# Patient Record
(demographics unavailable — no encounter records)

---

## 2025-03-25 NOTE — HISTORY OF PRESENT ILLNESS
[FreeTextEntry1] : marycruz [de-identified] : Here for follow up visit. Doing well. Appetitie, sleep,bms,voiding, mood all ok.  Interim and relevant labs, imaging and consultations reviewed.

## 2025-03-25 NOTE — PHYSICAL EXAM
[No Acute Distress] : no acute distress [Well Nourished] : well nourished [Well Developed] : well developed [Well-Appearing] : well-appearing [Normal Sclera/Conjunctiva] : normal sclera/conjunctiva [PERRL] : pupils equal round and reactive to light [EOMI] : extraocular movements intact [Normal Outer Ear/Nose] : the outer ears and nose were normal in appearance [Normal Oropharynx] : the oropharynx was normal [No JVD] : no jugular venous distention [No Lymphadenopathy] : no lymphadenopathy [Supple] : supple [Thyroid Normal, No Nodules] : the thyroid was normal and there were no nodules present [No Respiratory Distress] : no respiratory distress  [No Accessory Muscle Use] : no accessory muscle use [Clear to Auscultation] : lungs were clear to auscultation bilaterally [Normal Rate] : normal rate  [Regular Rhythm] : with a regular rhythm [Normal S1, S2] : normal S1 and S2 [No Murmur] : no murmur heard [No Carotid Bruits] : no carotid bruits [No Abdominal Bruit] : a ~M bruit was not heard ~T in the abdomen [No Varicosities] : no varicosities [Pedal Pulses Present] : the pedal pulses are present [No Edema] : there was no peripheral edema [No Palpable Aorta] : no palpable aorta [No Extremity Clubbing/Cyanosis] : no extremity clubbing/cyanosis [Soft] : abdomen soft [Non Tender] : non-tender [Non-distended] : non-distended [No Masses] : no abdominal mass palpated [No HSM] : no HSM [Normal Bowel Sounds] : normal bowel sounds [Normal Posterior Cervical Nodes] : no posterior cervical lymphadenopathy [Normal Anterior Cervical Nodes] : no anterior cervical lymphadenopathy [No CVA Tenderness] : no CVA  tenderness [No Spinal Tenderness] : no spinal tenderness [No Joint Swelling] : no joint swelling [Grossly Normal Strength/Tone] : grossly normal strength/tone [No Rash] : no rash [Coordination Grossly Intact] : coordination grossly intact [No Focal Deficits] : no focal deficits [Normal Gait] : normal gait [Deep Tendon Reflexes (DTR)] : deep tendon reflexes were 2+ and symmetric [Normal Affect] : the affect was normal [Normal Insight/Judgement] : insight and judgment were intact [de-identified] : no hernias [de-identified] : left face- 2 raised lesions

## 2025-03-25 NOTE — ASSESSMENT
[FreeTextEntry1] : obesity- discussed low calorie diet and exercise as part of a weight loss plan. htn- cont amlo/ lisinopril. declines addl meds today. will watch diet. gerd- lifestyle mod, try to wean off ppi to h2b. qod to start. f/u with gi for egd incr cad risk- 16% on AHA calc. discussed statin- declines for now. f/u with cards, consider CACS healthy lifestyle, d/e, chk labs, reviewed immunizations and prev. health measures, chk psa, rec. colonoscopy Discussed the importance of screening for colon cancer. Reviewed screening reccomendations including colonoscopy, Cologuard and Fit DNA testing. I strongly encouraged colonoscopy as that is the best screening test to detect both colon cancer and polyps and is the gold standard. Discussed the importance and benefit of a healthy lifestyle including a heart healthy diet such as the Mediterranean diet and regular exercise as well as 7 hours of sleep nightly. Urged to f/u with cardio and complete TST. declines all vaccines

## 2025-03-25 NOTE — HEALTH RISK ASSESSMENT
[Good] : ~his/her~  mood as  good [Never (0 pts)] : Never (0 points) [No] : In the past 12 months have you used drugs other than those required for medical reasons? No [No falls in past year] : Patient reported no falls in the past year [0] : 2) Feeling down, depressed, or hopeless: Not at all (0) [PHQ-2 Negative - No further assessment needed] : PHQ-2 Negative - No further assessment needed [Yes] : Reviewed medication list for presence of high-risk medications. [Never] : Never [Patient reported colonoscopy was normal] : Patient reported colonoscopy was normal [None] : None [With Significant Other] : lives with significant other [Employed] : employed [] :  [Feels Safe at Home] : Feels safe at home [Fully functional (bathing, dressing, toileting, transferring, walking, feeding)] : Fully functional (bathing, dressing, toileting, transferring, walking, feeding) [Fully functional (using the telephone, shopping, preparing meals, housekeeping, doing laundry, using] : Fully functional and needs no help or supervision to perform IADLs (using the telephone, shopping, preparing meals, housekeeping, doing laundry, using transportation, managing medications and managing finances) [Audit-CScore] : 0 [de-identified] : walks [de-identified] : overall healthy [[id number]] : 0 [Change in mental status noted] : No change in mental status noted [Language] : denies difficulty with language [Behavior] : denies difficulty with behavior [Handling Complex Tasks] : denies difficulty handling complex tasks [Reasoning] : denies difficulty with reasoning [Reports changes in hearing] : Reports no changes in hearing [Reports changes in vision] : Reports no changes in vision [Reports changes in dental health] : Reports no changes in dental health [ColonoscopyDate] : ~2018 [de-identified] : sees optho [AdvancecareDate] : 3/25/25

## 2025-03-27 NOTE — HISTORY OF PRESENT ILLNESS
[FreeTextEntry1] : 64M with HTN, for follow up  Last seen Sept 2022 with murmur TTE with increased AV gradients but no AS Otherwise feeling well without symptoms Patient denies chest pain, palpitations, dizziness, lightheadedness, syncope. 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 Works in billing for an . Grandfather and uncles had CAD.   ECG: SR, no ST-T wave changes TTE 2022: 60-65%, increased AV gradients  Amlodipine 10mg daily Lisinopril 40mg

## 2025-03-27 NOTE — DISCUSSION/SUMMARY
[FreeTextEntry1] : HTN: BP running higher. Notes higher at MD offices. Pt to monitor at home. Continue meds. Normal CMP   TTE with increased AV gradients Some dyspnea with heavy exertion Repeat TTE  Given risk factors for CAD, will send for CAC score   Recent a1c 6.3%  Aggressive risk factor modification

## 2025-03-27 NOTE — PHYSICAL EXAM

## 2025-05-03 NOTE — HISTORY OF PRESENT ILLNESS
[FreeTextEntry1] : 64 year yo male presents w/ elevated PSA 4.75.  Referred by PCP and is anxious about PSA value.  Denies having UTI's, or hematuria.  PSA: 2.97 (2023) -> 3.08 (2024) -> 4.75 (2025) Prostate Biopsy: None Reports: Occasional nocturia and frequency; no obstructive symptoms or hematuria.  Drinks ~ 2 L fluid a day,   PVR: 5 Peak Flow: 13.1 Voided:120    [Urinary Incontinence] : no urinary incontinence [Urinary Retention] : no urinary retention [Urinary Urgency] : no urinary urgency [Straining] : no straining [Weak Stream] : no weak stream [Intermittency] : no intermittency [Erectile Dysfunction] : no Erectile Dysfunction [Dysuria] : no dysuria [Hematuria - Gross] : no gross hematuria [Hematuria - Microscopic] : no microscopic hematuria [Abdominal Pain] : no abdominal pain [Flank Pain] : no flank pain [Weight Loss] : no recent ~M [unfilled] weight loss [Fever] : no fever

## 2025-05-03 NOTE — ASSESSMENT
[FreeTextEntry1] : PVR and Flow WNL, patient reports symptoms are not bothersome, anxious about elevated PSA  PSA + US - to determine size of prostate  Reassurance and education provided. We discussed the fact that PSA is a nonspecific test for cancer although it is prostate specific. We have reviewed the fact that elevations can be caused by multiple separate processes with the prostate including prostate cancer, benign prostate enlargement, prostate inflammation or infection, or combination of any of the above. I also have reviewed with the patient that there is age specificity related to PSA and that over time there is some expectation that the PSA will slowly rise over time.

## 2025-06-06 NOTE — ASSESSMENT
[FreeTextEntry1] : needs Ct chest to complete w/u next step Laparoscopic nephrectomy as definitive therapy. reviewed performance, hospitalization, recovery and complications.

## 2025-06-06 NOTE — HISTORY OF PRESENT ILLNESS
[FreeTextEntry1] : 64 year yo male presents w/ elevated PSA 4.75.  Referred by PCP and is anxious about PSA value.  Denies having UTI's, or hematuria.  PSA: 2.97 (2023) -> 3.08 (2024) -> 4.75 (2025) Prostate Biopsy: None Reports: Occasional nocturia and frequency; no obstructive symptoms or hematuria.  Drinks ~ 2 L fluid a day,   PVR: 5 Peak Flow: 13.1 Voided:120  6/25 had sent him for ULS to size prostate: noted a renal mass on the right. CT scan with contrast notes an enhancing 7.5cm right renal mass C/W RCC reviewed imaging with patient: no sign of metastasis  GFR 78

## 2025-06-18 NOTE — ASSESSMENT
[FreeTextEntry1] : RCC- psts scheduled . in otherwise optimal condition for procedure. Will review psts and write addendum for final clearance.  Anemia- saw gi, dr forrester. endoscopic w/u pending htn- cont amlo/ lisinopril. low Na diet gerd- lifestyle mod, try to wean off ppi to h2b. qod to start. f/u with gi for egd incr cad risk- 16% on AHA calc. discussed statin-saw cardio, CACS pending.  Discussed the importance and benefit of a healthy lifestyle including a heart healthy diet such as the Mediterranean diet and regular exercise as well as 7 hours of sleep nightly.  Pt. was encouraged to do all relevant screening tests including but not limited to colonoscopy, annual psa, annual skin exam and annual CT chest if smoker and meets criteria   Patient was given an opportunity to ask questions regarding this visit and is satisfied and in agreement with the plan of care. They are instructed to call with any questions, concerns, worsening or new symptoms.

## 2025-06-18 NOTE — HISTORY OF PRESENT ILLNESS
[FreeTextEntry1] : med cl [de-identified] : pt was referred to urol for uptrending of psa, as part of the w/u, found to have a R RCC scheduled for lap nephrectomy as definitive tx ct a/p- otherwise neg. psa enlarged without susp lesions psts mon june 30 surgery 7/7/25 otherwise feels fine.  Reviewed all interim as well as relevant prior consultations, labs and radiological studies.

## 2025-07-29 NOTE — ASSESSMENT
[FreeTextEntry1] : avoid NSAIDs and other nephrotoxins, renally dose all medications, and flag any iodinated contrast needs--coordinating with Urology/Radiology if imaging is required. 	-	I advised the patient to continue incision care, avoid lifting >10-15 lb and core-strain activities until 4-6 weeks post-op, and to resume activity gradually as tolerated. 	-	Oncologic coordination:continue Urology follow-up and place a Medical Oncology referral once final pathology is available (to consider adjuvant immunotherapy if higher-risk clear cell disease). Surveillance imaging (abdomen  chest) will be stage-risk-based; I will assist with scheduling after staging is confirmed. 	-	Red flags reviewed: I instructed the patient to seek urgent care/ED for fever, worsening abdominal/flank pain, wound erythema/drainage, gross hematuria, decreased urine output, or new bone pain/cough/unintentional weight loss.  2) Single-kidney status / 	I counseled on hydration and sick-day rules (hold nephrotoxic agents during vomiting/diarrhea/dehydration), and on BP/diabetes optimization to preserve renal function.   During the medical encounter, we extensively discussed the multifaceted approach to improving overall health through lifestyle modifications. This included detailed guidance on adopting a heart-healthy diet, with particular focus on the Mediterranean diet, emphasizing the intake of fruits, vegetables, whole grains, healthy fats, and lean proteins. We explored strategies for incorporating regular physical activity into daily routines, tailored to the patient's preferences and current capabilities, ensuring sustainability and gradual progression. Additionally, we delved into the critical role of sufficient rest and quality sleep, specifically aiming for at least seven hours nightly, to support physical recovery and cognitive function. This comprehensive discussion was complemented by a review of recent health data and the creation of a personalized action plan, ensuring that all advice is relevant and actionable for the patient. Total time spent was 40minutes, including 20 minutes face-to-face communication and 10 minutes dedicated 10 to chart review and documentation.

## 2025-07-29 NOTE — REVIEW OF SYSTEMS
[FreeTextEntry2] : 	Constitutional: No fevers, night sweats, weight loss. 	-	: No dysuria, hematuria, flank pain, or urinary retention. 	-	Pulm/Cardiac: No dyspnea, chest pain. 	-	GI: Tolerating diet; no N/V. 	-	VTE screen: No unilateral leg swelling, warmth, or pain.

## 2025-07-29 NOTE — PHYSICAL EXAM
[de-identified] : 	-	General: Well-appearing, in no distress. 	-	Lungs/Cardiac: Normal effort; regular rate/rhythm. 	-	Abdomen: Soft, nondistended. Laparoscopic port sites clean/dry/intact, no erythema, drainage, fluctuance, or dehiscence; mild expected barrie-incisional tenderness only. No CVA tenderness. 	-	Extremities: Warm, well perfused; no edema, calves nontender. 	-	Neuro: Grossly intact.

## 2025-07-29 NOTE — HISTORY OF PRESENT ILLNESS
[FreeTextEntry1] : f/u [de-identified] : 65-year-old male (HTN, GERD, obesity, elevated PSA under Urology care) 3 weeks post laparoscopic right nephrectomy for a renal mass reported consistent with RCC (final pathology/stage not available in my chart). He reports no complications, progressive improvement, no fever/chills, no cough/SOB, normal bowel movements, voiding without hematuria, and ambulates without difficulty. No calf pain or leg swelling. Analgesia minimal/none. He has avoided heavy lifting.

## 2025-07-29 NOTE — PHYSICAL EXAM
[de-identified] : 	-	General: Well-appearing, in no distress. 	-	Lungs/Cardiac: Normal effort; regular rate/rhythm. 	-	Abdomen: Soft, nondistended. Laparoscopic port sites clean/dry/intact, no erythema, drainage, fluctuance, or dehiscence; mild expected barrie-incisional tenderness only. No CVA tenderness. 	-	Extremities: Warm, well perfused; no edema, calves nontender. 	-	Neuro: Grossly intact.

## 2025-07-29 NOTE — HISTORY OF PRESENT ILLNESS
[FreeTextEntry1] : f/u [de-identified] : 65-year-old male (HTN, GERD, obesity, elevated PSA under Urology care) 3 weeks post laparoscopic right nephrectomy for a renal mass reported consistent with RCC (final pathology/stage not available in my chart). He reports no complications, progressive improvement, no fever/chills, no cough/SOB, normal bowel movements, voiding without hematuria, and ambulates without difficulty. No calf pain or leg swelling. Analgesia minimal/none. He has avoided heavy lifting.